# Patient Record
Sex: FEMALE | Race: WHITE
[De-identification: names, ages, dates, MRNs, and addresses within clinical notes are randomized per-mention and may not be internally consistent; named-entity substitution may affect disease eponyms.]

---

## 2020-05-02 ENCOUNTER — HOSPITAL ENCOUNTER (EMERGENCY)
Dept: HOSPITAL 63 - ER | Age: 60
Discharge: HOME | End: 2020-05-02
Payer: OTHER GOVERNMENT

## 2020-05-02 VITALS
WEIGHT: 293 LBS | SYSTOLIC BLOOD PRESSURE: 125 MMHG | BODY MASS INDEX: 47.09 KG/M2 | HEIGHT: 66 IN | DIASTOLIC BLOOD PRESSURE: 73 MMHG
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
## 2020-05-02 DIAGNOSIS — S93.401A: Primary | ICD-10-CM
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
## 2020-05-02 DIAGNOSIS — I10: ICD-10-CM

## 2020-05-02 PROCEDURE — 99284 EMERGENCY DEPT VISIT MOD MDM: CPT

## 2020-05-02 PROCEDURE — 99283 EMERGENCY DEPT VISIT LOW MDM: CPT

## 2020-05-02 PROCEDURE — 73610 X-RAY EXAM OF ANKLE: CPT

## 2020-05-02 PROCEDURE — 73630 X-RAY EXAM OF FOOT: CPT

## 2020-05-02 PROCEDURE — L4350 ANKLE CONTROL ORTHO PRE OTS: HCPCS

## 2020-05-02 NOTE — PHYS DOC
Past History


Past Medical History:  Hypertension


Past Surgical History:  Cholecystectomy


Smoking:  Non-smoker


Alcohol Use:  None





General Adult


EDM:


Chief Complaint:  ANKLE PROBLEM





HPI:


HPI:





Patient is a 59 year old female who presents for evaluation of a right foot and 

ankle injury.  Just prior to arrival patient was startled by her fire alarm and 

try to go down the stairs when the lights were off.  She has twisted her ankle 

and has a moderate swelling to the outer aspect of that foot and ankle.  There 

is no other reported injuries and patient did not hit her head.  She is unable 

to bear weight on the affected foot and ankle without significant pain.  She is 

otherwise benign-appearing





Review of Systems:


Review of Systems:


Constitutional:  Denies fever or chills 


Eyes:  Denies change in visual acuity 


HENT:  Denies nasal congestion


Respiratory:  Denies cough or shortness of breath 


Cardiovascular:  Denies chest pain or edema 


GI:  Denies abdominal pain, nausea, vomiting, or diarrhea 


:  Denies dysuria 


Musculoskeletal:  Denies back pain has right ankle pain


Integument:  Denies rash 


Neurologic:  Denies headache, focal weakness or sensory changes 


Endocrine:  unremarkable 


Lymphatic:  unremarkable


Psychiatric:  Denies depression or anxiety





Heart Score:


Risk Factors:


Risk Factors:  DM, Current or recent (<one month) smoker, HTN, HLP, family 

history of CAD, obesity.


Risk Scores:


Score 0 - 3:  2.5% MACE over next 6 weeks - Discharge Home


Score 4 - 6:  20.3% MACE over next 6 weeks - Admit for Clinical Observation


Score 7 - 10:  72.7% MACE over next 6 weeks - Early Invasive Strategies





Physical Exam:


PE:





Constitutional: Well developed, well nourished, mild acute distress, non-toxic 

appearance. []


HENT: Normocephalic, atraumatic, bilateral external ears normal, oropharynx 

moist, no oral exudates, nose normal. []


Eyes: PERRL, EOMI, conjunctiva normal, no discharge. [] 


Neck: Normal range of motion, no tenderness, supple, no stridor. [] 


Cardiovascular:Heart rate regular rhythm, no murmur []


Lungs & Thorax:  Bilateral breath sounds clear to auscultation []


Abdomen: Bowel sounds normal, soft, no tenderness, no masses, no pulsatile 

masses. [] 


Skin: Warm, dry, no erythema, no rash. [] 


Back: No tenderness. [] 


Extremities: Moderate swelling lateral aspect right foot and ankle no cyanosis, 

no clubbing, ROM intact [] 


Neurologic: Alert and oriented, normal motor function, normal sensory function, 

no focal deficits noted. []


Psychologic: Affect normal, judgement normal, mood normal. []





EKG:


EKG:


[]





Radiology/Procedures:


Radiology/Procedures:


0258 x-rays reviewed by me.  Patient does not have an obvious fracture or 

dislocation on her right ankle or right foot.  Soft tissue swelling noted 

laterally.  []





Course & Med Decision Making:


Course & Med Decision Making


Pertinent Labs and Imaging studies reviewed. (See chart for details)


0258 patient reexamined.  Will place patient in a Aircast splint.  Crutches will

 be given as well.  Supportive care recommended.  No obvious fracture seen.  

Close follow-up with family doctor recommended especially if this not 

significantly better in the next week.  Detailed follow-up instructions given as

 well as crutch use





Dragon Disclaimer:


Dragon Disclaimer:


This electronic medical record was generated, in whole or in part, using a voice

 recognition dictation system.





Departure


Departure:


Impression:  


   Primary Impression:  


   Moderate right ankle sprain


   Qualified Codes:  S93.401A - Sprain of unspecified ligament of right ankle, 

   initial encounter


Disposition:  01 HOME, SELF-CARE


Condition:  STABLE


Referrals:  


LAM MONTIEL MD (PCP)


Patient Instructions:  Ankle Sprain, Crutch Use, Easy-to-Read





Additional Instructions:  


Rest, ice and elevate the affected right ankle and foot.  Limited weightbearing 

for the next several days.  Call and see your doctor as needed less than 1 week.

  We do not see an obvious fracture noted on your x-ray but if you are still in 

severe pain in 1 week you should get it re-x-rayed











MAREK POLANCO DO               May 2, 2020 02:54

## 2020-05-02 NOTE — RAD
Study:

1. CR FOOT RIGHT 3V

2. CR ANKLE RIGHT 3V

 

Indication: Pain after a fall.

 

Comparison: None.

 

Findings:

 

Ankle:

 

Prominent soft tissue swelling at the lateral ankle and extending along 

the hindfoot. Probable ankle joint effusion. Faint densities project 

distal to the tip of the fibula and along the lateral talar process but 

these are not definitively osseous fragments. Small focus of 

mineralization at the tip of the lateral malleolus exhibits features of 

chronicity. Collectively no acute fracture is seen at the ankle. Alignment

is maintained noting the absence of weightbearing.

 

Foot:

 

Possible small fracture fragment seen on the AP view projecting along the 

lateral margin of the calcaneocuboid joint. This does not definitively 

correspond to the os peroneum seen on the additional views. No findings 

suspicious for a fracture elsewhere. No traumatic malalignment.

 

Mild great toe MTP joint arthrosis.

 

Impression:

 

Ankle:

1. Prominent soft tissue swelling at the lateral ankle extending along the

hindfoot as well as an ankle joint effusion but without a definite ankle 

fracture. As deemed necessary, follow-up radiographs could be performed 

such as 10-14 days to better evaluate given the extent of soft tissue 

swelling. No traumatic malalignment.

 

Foot:

1. Possible small fracture fragment projecting along the lateral aspect of

the calcaneocuboid joint. 

 

Electronically signed by: TONI WOLFE MD (5/2/2020 4:23 AM) [id number]